# Patient Record
Sex: FEMALE | Race: WHITE | NOT HISPANIC OR LATINO | URBAN - METROPOLITAN AREA
[De-identification: names, ages, dates, MRNs, and addresses within clinical notes are randomized per-mention and may not be internally consistent; named-entity substitution may affect disease eponyms.]

---
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
## 2022-06-24 ENCOUNTER — RX ONLY (RX ONLY)
Age: 22
End: 2022-06-24

## 2022-06-24 ENCOUNTER — OFFICE (OUTPATIENT)
Dept: URBAN - METROPOLITAN AREA CLINIC 63 | Facility: CLINIC | Age: 22
Setting detail: OPHTHALMOLOGY
End: 2022-06-24
Payer: COMMERCIAL

## 2022-06-24 DIAGNOSIS — B30.9: ICD-10-CM

## 2022-06-24 PROCEDURE — 92002 INTRM OPH EXAM NEW PATIENT: CPT | Performed by: STUDENT IN AN ORGANIZED HEALTH CARE EDUCATION/TRAINING PROGRAM

## 2022-06-24 ASSESSMENT — REFRACTION_AUTOREFRACTION
OD_SPHERE: +0.50
OD_AXIS: 110
OS_AXIS: 040
OS_CYLINDER: -0.25
OS_SPHERE: +0.25
OD_CYLINDER: -0.25

## 2022-06-24 ASSESSMENT — KERATOMETRY
OD_AXISANGLE_DEGREES: 090
OS_AXISANGLE_DEGREES: 092
OD_K1POWER_DIOPTERS: 43.00
OD_K2POWER_DIOPTERS: 43.00
OS_K2POWER_DIOPTERS: 43.00
OS_K1POWER_DIOPTERS: 42.75

## 2022-06-24 ASSESSMENT — VISUAL ACUITY
OD_BCVA: 20/20
OS_BCVA: 20/20

## 2022-06-24 ASSESSMENT — AXIALLENGTH_DERIVED
OD_AL: 23.6295
OS_AL: 23.7742

## 2022-06-24 ASSESSMENT — SPHEQUIV_DERIVED
OD_SPHEQUIV: 0.375
OS_SPHEQUIV: 0.125

## 2022-06-24 ASSESSMENT — CONFRONTATIONAL VISUAL FIELD TEST (CVF)
OD_FINDINGS: FULL
OS_FINDINGS: FULL

## 2022-06-24 ASSESSMENT — TONOMETRY
OD_IOP_MMHG: 15
OS_IOP_MMHG: 14

## 2024-08-15 ENCOUNTER — NON-APPOINTMENT (OUTPATIENT)
Age: 24
End: 2024-08-15

## 2024-08-19 ENCOUNTER — NON-APPOINTMENT (OUTPATIENT)
Age: 24
End: 2024-08-19

## 2024-08-19 ENCOUNTER — TRANSCRIPTION ENCOUNTER (OUTPATIENT)
Age: 24
End: 2024-08-19

## 2024-08-19 ENCOUNTER — APPOINTMENT (OUTPATIENT)
Dept: ORTHOPEDIC SURGERY | Facility: CLINIC | Age: 24
End: 2024-08-19
Payer: COMMERCIAL

## 2024-08-19 VITALS
WEIGHT: 110 LBS | HEART RATE: 68 BPM | HEIGHT: 65 IN | DIASTOLIC BLOOD PRESSURE: 76 MMHG | SYSTOLIC BLOOD PRESSURE: 119 MMHG | BODY MASS INDEX: 18.33 KG/M2

## 2024-08-19 DIAGNOSIS — Z87.39 PERSONAL HISTORY OF OTHER DISEASES OF THE MUSCULOSKELETAL SYSTEM AND CONNECTIVE TISSUE: ICD-10-CM

## 2024-08-19 DIAGNOSIS — M41.9 SCOLIOSIS, UNSPECIFIED: ICD-10-CM

## 2024-08-19 PROBLEM — Z00.00 ENCOUNTER FOR PREVENTIVE HEALTH EXAMINATION: Status: ACTIVE | Noted: 2024-08-19

## 2024-08-19 PROCEDURE — 99204 OFFICE O/P NEW MOD 45 MIN: CPT

## 2024-08-19 PROCEDURE — 72070 X-RAY EXAM THORAC SPINE 2VWS: CPT

## 2024-08-19 PROCEDURE — 72100 X-RAY EXAM L-S SPINE 2/3 VWS: CPT

## 2024-08-21 NOTE — DISCUSSION/SUMMARY
[Surgical risks reviewed] : Surgical risks reviewed [PRN] : PRN [de-identified] : Patient is encouraged to stay active and add exercises emphasizing on postural exercises and core strengthening, especially during pregnancy.  Patient is encouraged to follow up every three years to get xrays and see the progression of the curvature. If patient notes a sudden change in her measurement in her clothing, gait, or noted physical signs os change, she is to be seen sooner.  I, Dr. Mk Bills, personally performed the evaluation and management (E/M) services for this new patient.  That E/M includes conducting the initial examination, assessing all conditions, and establishing a plan of care.  Today, my ACP, Radha Auguste, was here to observe my evaluation and management services for this patient to be followed going forward.

## 2024-08-21 NOTE — PHYSICAL EXAM
[ALL] : Biceps, brachioradialis, triceps, patellar, ankle and plantar 2+ and symmetric bilaterally [Normal DTR Reflexes] : DTR reflexes normal [Normal] : Oriented to person, place, and time, insight and judgement were intact and the affect was normal [LE] : Sensory: Intact in bilateral lower extremities [Knee] : patellar 2+ and symmetric bilaterally [Ankle] : ankle 2+ and symmetric bilaterally [Normal RLE] : Right Lower Extremity: No scars, rashes, lesions, ulcers, skin intact [Normal LLE] : Left Lower Extremity: No scars, rashes, lesions, ulcers, skin intact [de-identified] : French forward bending test reveals a right thoracic left lumbar flank deformity which mildly corrects on right lateral bending. [de-identified] : thoracic spine with noted curvature and right shoulder higher than left shoulder with a forward bend test. Asymmetrical shoulders noted that is mild on exam. [de-identified] : Scoliosis xrays of the thoracic spine on AP demonstrating a Vidal angle of 28 degrees.  Lateral view is unremarkable for typical hypokyphosis, no obvious fractures, no bony lesions,

## 2024-08-21 NOTE — DISCUSSION/SUMMARY
[Surgical risks reviewed] : Surgical risks reviewed [PRN] : PRN [de-identified] : Patient is encouraged to stay active and add exercises emphasizing on postural exercises and core strengthening, especially during pregnancy.  Patient is encouraged to follow up every three years to get xrays and see the progression of the curvature. If patient notes a sudden change in her measurement in her clothing, gait, or noted physical signs os change, she is to be seen sooner.  I, Dr. Mk Bills, personally performed the evaluation and management (E/M) services for this new patient.  That E/M includes conducting the initial examination, assessing all conditions, and establishing a plan of care.  Today, my ACP, Radha Auguste, was here to observe my evaluation and management services for this patient to be followed going forward.

## 2024-08-21 NOTE — HISTORY OF PRESENT ILLNESS
[Pain Location] : pain [T-Spine] : T-spine region [Lumbar] : lumbar region [Stable] : stable [___ yrs] : [unfilled] year(s) ago [0] : a current pain level of 0/10 [Exercise Regimen] : relieved by exercise regimen [de-identified] : Patient is a 24-year-old female with adolescent onset of scoliosis, who used to wear a brace all day and remove it with after school sports, and back on again and slept with it. Patient was being monitored of her scoliosis with a physician who no longer is in her medical insurance plan. On her last visit several years ago patient had a 26-degree curvature of the thoracolumbar spine.  Patient is also concerned that these past years she may have worsened and is interested on future care of her scoliosis. Patient denies of any pain, occasional stiffness.

## 2024-08-21 NOTE — PHYSICAL EXAM
[ALL] : Biceps, brachioradialis, triceps, patellar, ankle and plantar 2+ and symmetric bilaterally [Normal DTR Reflexes] : DTR reflexes normal [Normal] : Oriented to person, place, and time, insight and judgement were intact and the affect was normal [LE] : Sensory: Intact in bilateral lower extremities [Knee] : patellar 2+ and symmetric bilaterally [Ankle] : ankle 2+ and symmetric bilaterally [Normal RLE] : Right Lower Extremity: No scars, rashes, lesions, ulcers, skin intact [Normal LLE] : Left Lower Extremity: No scars, rashes, lesions, ulcers, skin intact [de-identified] : French forward bending test reveals a right thoracic left lumbar flank deformity which mildly corrects on right lateral bending. [de-identified] : thoracic spine with noted curvature and right shoulder higher than left shoulder with a forward bend test. Asymmetrical shoulders noted that is mild on exam. [de-identified] : Scoliosis xrays of the thoracic spine on AP demonstrating a Vidal angle of 28 degrees.  Lateral view is unremarkable for typical hypokyphosis, no obvious fractures, no bony lesions,

## 2024-08-21 NOTE — HISTORY OF PRESENT ILLNESS
[Pain Location] : pain [T-Spine] : T-spine region [Lumbar] : lumbar region [Stable] : stable [___ yrs] : [unfilled] year(s) ago [0] : a current pain level of 0/10 [Exercise Regimen] : relieved by exercise regimen [de-identified] : Patient is a 24-year-old female with adolescent onset of scoliosis, who used to wear a brace all day and remove it with after school sports, and back on again and slept with it. Patient was being monitored of her scoliosis with a physician who no longer is in her medical insurance plan. On her last visit several years ago patient had a 26-degree curvature of the thoracolumbar spine.  Patient is also concerned that these past years she may have worsened and is interested on future care of her scoliosis. Patient denies of any pain, occasional stiffness.

## 2025-01-03 ENCOUNTER — NON-APPOINTMENT (OUTPATIENT)
Age: 25
End: 2025-01-03

## 2025-09-19 ENCOUNTER — NON-APPOINTMENT (OUTPATIENT)
Age: 25
End: 2025-09-19